# Patient Record
Sex: FEMALE | URBAN - METROPOLITAN AREA
[De-identification: names, ages, dates, MRNs, and addresses within clinical notes are randomized per-mention and may not be internally consistent; named-entity substitution may affect disease eponyms.]

---

## 2023-04-17 ENCOUNTER — TELEPHONE (OUTPATIENT)
Dept: GASTROENTEROLOGY | Facility: CLINIC | Age: 63
End: 2023-04-17

## 2023-04-17 VITALS — HEIGHT: 67 IN | BODY MASS INDEX: 26.68 KG/M2 | WEIGHT: 170 LBS

## 2023-04-17 DIAGNOSIS — Z12.11 SCREENING FOR COLON CANCER: ICD-10-CM

## 2023-04-17 DIAGNOSIS — Z86.010 PERSONAL HISTORY OF COLONIC POLYPS: Primary | ICD-10-CM

## 2023-04-17 RX ORDER — EZETIMIBE 10 MG/1
10 TABLET ORAL
COMMUNITY
Start: 2023-03-03

## 2023-04-17 RX ORDER — AMOXICILLIN 500 MG
CAPSULE ORAL DAILY
COMMUNITY
End: 2024-03-26

## 2023-04-17 NOTE — TELEPHONE ENCOUNTER
----- Message from Erica Lancaster sent at 4/10/2023  3:48 PM CDT -----  Contact: Rocio    ----- Message -----  From: Kady Arias  Sent: 3/31/2023   9:06 AM CDT  To: [facility] Procedure Scheduling    Rocio got a letter stated that its time to schedule her colonoscopy, Please call her at [phone].    Thanks  Td

## 2023-04-17 NOTE — TELEPHONE ENCOUNTER
Returned pt call and scheduled  5 yr repeat colon w/ RMM at [facility] on 06/13/23. Updated chart. Reviewed instructions w/ pt who voiced understanding. Instructions mailed to pt per her request - VL

## 2023-04-18 RX ORDER — SOD SULF/POT CHLORIDE/MAG SULF 1.479 G
12 TABLET ORAL DAILY
Qty: 24 TABLET | Refills: 0 | Status: SHIPPED | OUTPATIENT
Start: 2023-04-18 | End: 2024-03-26

## 2024-02-19 DIAGNOSIS — Z12.11 SCREENING FOR COLON CANCER: Primary | ICD-10-CM

## 2024-03-26 ENCOUNTER — TELEPHONE (OUTPATIENT)
Dept: GASTROENTEROLOGY | Facility: CLINIC | Age: 64
End: 2024-03-26
Payer: COMMERCIAL

## 2024-03-26 VITALS — WEIGHT: 175 LBS | HEIGHT: 67 IN | BODY MASS INDEX: 27.47 KG/M2

## 2024-03-26 DIAGNOSIS — Z12.11 COLON CANCER SCREENING: ICD-10-CM

## 2024-03-26 DIAGNOSIS — Z86.010 PERSONAL HISTORY OF COLONIC POLYPS: Primary | ICD-10-CM

## 2024-04-03 RX ORDER — POLYETHYLENE GLYCOL 3350, SODIUM SULFATE ANHYDROUS, SODIUM BICARBONATE, SODIUM CHLORIDE, POTASSIUM CHLORIDE 236; 22.74; 6.74; 5.86; 2.97 G/4L; G/4L; G/4L; G/4L; G/4L
4 POWDER, FOR SOLUTION ORAL ONCE
Qty: 4000 ML | Refills: 0 | Status: SHIPPED | OUTPATIENT
Start: 2024-04-03 | End: 2024-04-03

## 2024-07-31 ENCOUNTER — TELEPHONE (OUTPATIENT)
Dept: GASTROENTEROLOGY | Facility: CLINIC | Age: 64
End: 2024-07-31
Payer: COMMERCIAL

## 2024-07-31 VITALS — WEIGHT: 175 LBS | HEIGHT: 67 IN | BODY MASS INDEX: 27.47 KG/M2

## 2024-07-31 DIAGNOSIS — Z12.11 COLON CANCER SCREENING: ICD-10-CM

## 2024-07-31 DIAGNOSIS — Z86.010 PERSONAL HISTORY OF COLONIC POLYPS: Primary | ICD-10-CM

## 2024-07-31 NOTE — TELEPHONE ENCOUNTER
S/w pt and told her that I was calling as a courtesy regarding up coming Colon with [facility] on 8/6/24, Tuesday and wanted to verify that she has her paper prep instructions and meds Pt stated she has both. I also mentioned that [facility] will call the day before (Mon) with the arrival time, GI Lab is located on the third floor, and to pre-register any time this week. marcella

## 2024-07-31 NOTE — TELEPHONE ENCOUNTER
"[facility]  [address] [place] [address]  Phone: [phone]  Fax: [phone]    History & Physical         Provider: Dr. Carolyn Farah    Patient Name: Rocio MORENO (age):1960  64 y.o.           Gender: female   Phone: [phone]     Referring Physician: Blanco Mata     Vital Signs:   Height - 5' 7"  Weight - 175 lb  BMI -  27.41    Plan: Colonoscopy @ [facility]    Encounter Diagnoses   Name Primary?    Personal history of colonic polyps Yes    Colon cancer screening            History:      Past Medical History:   Diagnosis Date    BMI 27.4     Diverticulosis large intestine w/o perforation or abscess w/o bleeding     High cholesterol     Personal history of colonic polyps 2017      Past Surgical History:   Procedure Laterality Date     SECTION      x 4    COLONOSCOPY W/ POLYPECTOMY  2017    TONSILLECTOMY        Medication List with Changes/Refills   Current Medications    EZETIMIBE (ZETIA) 10 MG TABLET    Take 10 mg by mouth.      Review of patient's allergies indicates:   Allergen Reactions    Azithromycin     Penicillins       Family History   Problem Relation Name Age of Onset    Stroke Mother      Hypertension Mother      Ulcers Mother      Heart attack Father      Crohn's disease Brother      No Known Problems Paternal Grandmother      No Known Problems Paternal Grandfather      Ulcerative colitis Neg Hx      Colon cancer Neg Hx      Pancreatic cancer Neg Hx      Liver disease Neg Hx      Throat cancer Neg Hx      Esophageal cancer Neg Hx      Stomach cancer Neg Hx        Social History     Tobacco Use    Smoking status: Never    Smokeless tobacco: Never   Substance Use Topics    Alcohol use: Never    Drug use: Never        Physical Examination:     General Appearance:___________________________  HEENT: " _____________________________________  Abdomen:____________________________________  Heart:________________________________________  Lungs:_______________________________________  Extremities:___________________________________  Skin:_________________________________________  Endocrine:____________________________________  Genitourinary:_________________________________  Neurological:__________________________________      Patient has been evaluated immediately prior to sedation and is medically cleared for endoscopy with IVCS as an ASA class: ______      Physician Signature: _________________________       Date: ________  Time: ________

## 2024-08-06 ENCOUNTER — OUTSIDE PLACE OF SERVICE (OUTPATIENT)
Dept: GASTROENTEROLOGY | Facility: CLINIC | Age: 64
End: 2024-08-06

## 2024-08-06 LAB — CRC RECOMMENDATION EXT: NORMAL

## 2024-08-13 ENCOUNTER — TELEPHONE (OUTPATIENT)
Dept: GASTROENTEROLOGY | Facility: CLINIC | Age: 64
End: 2024-08-13
Payer: COMMERCIAL

## 2024-08-14 NOTE — TELEPHONE ENCOUNTER
Spoke with patient to inform her of Colonoscopy results and repeat in 5 years.    Patient verbalized understanding of this information. Recall tab is up to date in patient's chart. -SALMA, LPN

## 2024-09-10 ENCOUNTER — DOCUMENTATION ONLY (OUTPATIENT)
Dept: GASTROENTEROLOGY | Facility: CLINIC | Age: 64
End: 2024-09-10
Payer: COMMERCIAL

## 2024-09-20 NOTE — TELEPHONE ENCOUNTER
Order for colonscopy faxed to [facility] to central scheduling. 4/4/24 LRA   
Order signed.  FUENTES  
Patient denied having any current problems or issues. Patient also denied having any hx of seizures, sleep apnea, or kidney disease. Patients brother has crohn's disease and she is unsure on the onset age. Patients maternal grandmother has twisted vowels and never recovered from it. Unsure of onset age of maternal grandmother. Patient stated she has a hard time waking up from anesthesia. Patient stated she cannot take the sutab pills. It is hard for her to swallow. Patient had previous colonoscopy with Dr. Chavez on 12/7/2017. Findings was multiple non-bleeding diverticula with medium openings were seen in the sigmoid colon. Diverticulosis appeared to be of moderate severity. Small grade 1 internal hemorrhoids were noted. Benign internal anal skin tag noted. A single sessile 3 mm polyp of benign appearance was found in the descending colon at 60 cm from the anus. A single -piece polypectomy was performed using a hot snare. The polyp was completely removed. A single sessile 3 mm polyp of benign appearance was found in the sigmoid colon at 20 cm from the anus. A single-piece polypectomy was performed using a hot snare. The polyp was completely removed. Chart was reviewed and updated with patient. Prep instructions were reviewed and mailed to patients address. Patient does not have an email.     Colonoscopy @ [facility] by Dr. Farah on August 6, 2024.-Reviewed by LESLI   
98.1